# Patient Record
Sex: MALE | Race: WHITE | Employment: FULL TIME | URBAN - METROPOLITAN AREA
[De-identification: names, ages, dates, MRNs, and addresses within clinical notes are randomized per-mention and may not be internally consistent; named-entity substitution may affect disease eponyms.]

---

## 2017-02-20 ENCOUNTER — CLINICAL SUPPORT (OUTPATIENT)
Dept: CARDIOLOGY CLINIC | Age: 52
End: 2017-02-20

## 2017-02-20 DIAGNOSIS — Z02.89 EXAMINATION, PHYSICAL, EMPLOYEE: Primary | ICD-10-CM

## 2019-02-15 LAB
HBA1C MFR BLD HPLC: 5.4 %
LDL-C, EXTERNAL: 180

## 2020-11-16 VITALS
HEIGHT: 70 IN | OXYGEN SATURATION: 97 % | RESPIRATION RATE: 18 BRPM | TEMPERATURE: 98 F | HEART RATE: 68 BPM | WEIGHT: 209.75 LBS | DIASTOLIC BLOOD PRESSURE: 91 MMHG | SYSTOLIC BLOOD PRESSURE: 121 MMHG | BODY MASS INDEX: 30.03 KG/M2

## 2020-11-16 PROBLEM — E03.9 ACQUIRED HYPOTHYROIDISM: Status: ACTIVE | Noted: 2017-03-16

## 2020-11-16 RX ORDER — LEVOTHYROXINE SODIUM 75 UG/1
TABLET ORAL
COMMUNITY
Start: 2020-10-29 | End: 2020-11-24 | Stop reason: SDUPTHER

## 2020-11-24 ENCOUNTER — OFFICE VISIT (OUTPATIENT)
Dept: PRIMARY CARE CLINIC | Age: 55
End: 2020-11-24
Payer: COMMERCIAL

## 2020-11-24 VITALS
BODY MASS INDEX: 30.24 KG/M2 | HEIGHT: 70 IN | SYSTOLIC BLOOD PRESSURE: 134 MMHG | WEIGHT: 211.25 LBS | OXYGEN SATURATION: 96 % | TEMPERATURE: 97.1 F | DIASTOLIC BLOOD PRESSURE: 89 MMHG | RESPIRATION RATE: 20 BRPM | HEART RATE: 84 BPM

## 2020-11-24 DIAGNOSIS — E78.2 MIXED HYPERLIPIDEMIA: ICD-10-CM

## 2020-11-24 DIAGNOSIS — E03.9 ACQUIRED HYPOTHYROIDISM: Primary | ICD-10-CM

## 2020-11-24 DIAGNOSIS — E66.09 CLASS 1 OBESITY DUE TO EXCESS CALORIES WITHOUT SERIOUS COMORBIDITY WITH BODY MASS INDEX (BMI) OF 30.0 TO 30.9 IN ADULT: ICD-10-CM

## 2020-11-24 PROCEDURE — 99213 OFFICE O/P EST LOW 20 MIN: CPT | Performed by: FAMILY MEDICINE

## 2020-11-24 RX ORDER — LEVOTHYROXINE SODIUM 75 UG/1
TABLET ORAL
Qty: 90 TAB | Refills: 1 | Status: SHIPPED | OUTPATIENT
Start: 2020-11-24 | End: 2021-08-06

## 2020-11-24 NOTE — PROGRESS NOTES
Olga Lidia Mccormick is a 50 y.o. male who presents today for the following:  Chief Complaint   Patient presents with    Medication Refill   ,     ICD-10-CM ICD-9-CM    1. Acquired hypothyroidism  E03.9 244.9 Euthyrox 75 mcg tablet   2. Class 1 obesity due to excess calories without serious comorbidity with body mass index (BMI) of 30.0 to 30.9 in adult  E66.09 278.00     Z68.30 V85.30    3. Mixed hyperlipidemia  E78.2 272.2     . Patient comes in for follow-up of his hypothyroid disease. He also has obesity with history of hyperlipidemia. He gets an annual physical through his work and always sends us a copy. We will defer any lab work today until we receive that. He plans to do this in February 2021. He feels well with no new problems    No Known Allergies    Current Outpatient Medications   Medication Sig    Euthyrox 75 mcg tablet Take 1 tablet each day     No current facility-administered medications for this visit.         Past Medical History:   Diagnosis Date    Acquired hypothyroidism 3/16/2017       Past Surgical History:   Procedure Laterality Date    HX APPENDECTOMY      HX HEENT  2016    HPV throat       Family History   Problem Relation Age of Onset    No Known Problems Mother     No Known Problems Father        Social History     Socioeconomic History    Marital status: UNKNOWN     Spouse name: Not on file    Number of children: Not on file    Years of education: Not on file    Highest education level: Not on file   Occupational History    Not on file   Social Needs    Financial resource strain: Not on file    Food insecurity     Worry: Not on file     Inability: Not on file   [employer] needs     Medical: Not on file     Non-medical: Not on file   Tobacco Use    Smoking status: Never Smoker    Smokeless tobacco: Never Used   Substance and Sexual Activity    Alcohol use: Yes     Comment: Moderate    Drug use: Never    Sexual activity: Not on file   Lifestyle    Physical activity     Days per week: Not on file     Minutes per session: Not on file    Stress: Not on file   Relationships    Social connections     Talks on phone: Not on file     Gets together: Not on file     Attends [employer] service: Not on file     Active member of club or organization: Not on file     Attends meetings of clubs or organizations: Not on file     Relationship status: Not on file    Intimate partner violence     Fear of current or ex partner: Not on file     Emotionally abused: Not on file     Physically abused: Not on file     Forced sexual activity: Not on file   Other Topics Concern    Not on file   Social History Narrative    Not on file         Review of Systems   Constitutional: Negative. Respiratory: Negative. Cardiovascular: Negative. Gastrointestinal: Negative. Genitourinary: Negative. Musculoskeletal: Negative. Neurological: Negative. Endo/Heme/Allergies:        Doing well on current dose of thyroid medication   Psychiatric/Behavioral: Negative. All other systems reviewed and are negative. Visit Vitals  /89 (BP 1 Location: Right arm, BP Patient Position: Sitting)   Pulse 84   Temp 97.1 °F (36.2 °C) (Skin)   Resp 20   Ht 5' 10\" (1.778 m)   Wt 211 lb 4 oz (95.8 kg)   SpO2 96%   BMI 30.31 kg/m²       Physical Exam  Vitals signs and nursing note reviewed. Constitutional:       Appearance: Normal appearance. He is normal weight. HENT:      Head: Normocephalic and atraumatic. Neck:      Musculoskeletal: Normal range of motion and neck supple. Cardiovascular:      Rate and Rhythm: Normal rate and regular rhythm. Pulses: Normal pulses. Heart sounds: Normal heart sounds. Pulmonary:      Effort: Pulmonary effort is normal.      Breath sounds: Normal breath sounds. Abdominal:      General: Abdomen is flat. Bowel sounds are normal.      Palpations: Abdomen is soft. Musculoskeletal: Normal range of motion.    Skin:     General: Skin is warm and dry. Neurological:      General: No focal deficit present. Mental Status: He is alert. Psychiatric:         Mood and Affect: Mood normal.         Behavior: Behavior normal.         Thought Content: Thought content normal.         Judgment: Judgment normal.            ICD-10-CM ICD-9-CM    1. Acquired hypothyroidism  E03.9 244.9 Euthyrox 75 mcg tablet   2. Class 1 obesity due to excess calories without serious comorbidity with body mass index (BMI) of 30.0 to 30.9 in adult  E66.09 278.00     Z68.30 V85.30    3. Mixed hyperlipidemia  E78.2 272.2         1. Acquired hypothyroidism    - Euthyrox 75 mcg tablet; Take 1 tablet each day  Dispense: 90 Tab; Refill: 1    2. Class 1 obesity due to excess calories without serious comorbidity with body mass index (BMI) of 30.0 to 30.9 in adult      3.  Mixed hyperlipidemia

## 2020-11-24 NOTE — LETTER
11/24/2020 5:25 PM 
 
Mr. Nabil Blanchard [address] [id number] To whom it may concern: My patient, Yoseph Davenport will be going out of the country to attend a wedding. He will be returning to the [place] on 12/11/2020 and will need to quarantine from 12/12/2020 to 12/25/2020/ He will need to use his sick leave for this period of quarantine. If you have additional questions or requirements, please let me know. Sincerely, Signed By: Kamilah Tsang MD   
 November 24, 2020 Kamilah Tsang MD

## 2020-11-30 NOTE — PATIENT INSTRUCTIONS
Hypothyroidism: Care Instructions Your Care Instructions When you have hypothyroidism, your body doesn't make enough thyroid hormone. This hormone helps your body use energy. If your thyroid level is low, you may feel tired, be constipated, have an increase in your blood pressure, or have dry skin or memory problems. You may also get cold easily, even when it is warm. Women with low thyroid levels may have heavy menstrual periods. A blood test to find your thyroid-stimulating hormone (TSH) level is used to check for hypothyroidism. A high TSH level may mean that you have it. The treatment for hypothyroidism is thyroid hormone pills. You should start to feel better in 1 to 2 weeks. Most people need treatment for the rest of their lives. You will need regular visits with your doctor to make sure you are doing well and that you have the right dose of medicine. Follow-up care is a key part of your treatment and safety. Be sure to make and go to all appointments, and call your doctor if you are having problems. It's also a good idea to know your test results and keep a list of the medicines you take. How can you care for yourself at home? · Take your thyroid hormone medicine exactly as prescribed. Call your doctor if you think you are having a problem with your medicine. Most people do not have side effects if they take the right amount of medicine regularly. ? Take the medicine 30 minutes before breakfast, and do not take it with calcium, vitamins, or iron. ? Do not take extra doses of your thyroid medicine. It will not help you get better any faster, and it may cause side effects. ? If you forget to take a dose, do NOT take a double dose of medicine. Take your usual dose the next day. · Tell your doctor about all prescription, herbal, or over-the-counter products you take. · Take care of yourself. Eat a healthy diet, get enough sleep, and get regular exercise. When should you call for help? Call 911 anytime you think you may need emergency care. For example, call if: 
  · You passed out (lost consciousness).  
  · You have severe trouble breathing.  
  · You have a very slow heartbeat (less than 60 beats a minute).  
  · You have a low body temperature (95°F or below). Call your doctor now or seek immediate medical care if: 
  · You feel tired, sluggish, or weak.  
  · You have trouble remembering things or concentrating.  
  · You do not begin to feel better 2 weeks after starting your medicine. Watch closely for changes in your health, and be sure to contact your doctor if you have any problems. Where can you learn more? Go to http://www.gray.com/ Enter U508 in the search box to learn more about \"Hypothyroidism: Care Instructions. \" Current as of: March 31, 2020               Content Version: 12.6 © 6722-6049 [employer]. Care instructions adapted under license by [employer] (which disclaims liability or warranty for this information). If you have questions about a medical condition or this instruction, always ask your healthcare professional. [address] any warranty or liability for your use of this information.

## 2021-08-03 DIAGNOSIS — E03.9 ACQUIRED HYPOTHYROIDISM: ICD-10-CM

## 2021-08-06 RX ORDER — LEVOTHYROXINE SODIUM 75 UG/1
TABLET ORAL
Qty: 90 TABLET | Refills: 0 | Status: SHIPPED | OUTPATIENT
Start: 2021-08-06 | End: 2021-09-13 | Stop reason: SDUPTHER

## 2021-09-13 DIAGNOSIS — E03.9 ACQUIRED HYPOTHYROIDISM: ICD-10-CM

## 2021-09-13 NOTE — TELEPHONE ENCOUNTER
Requested Prescriptions     Pending Prescriptions Disp Refills    Euthyrox 75 mcg tablet 90 Tablet 0     Sig: Take 1 tablet by mouth once daily   patient has a scheduled appointment for 10/21/21 at 1:30pm.

## 2021-09-15 RX ORDER — LEVOTHYROXINE SODIUM 75 UG/1
TABLET ORAL
Qty: 90 TABLET | Refills: 1 | Status: SHIPPED | OUTPATIENT
Start: 2021-09-15

## 2021-10-21 ENCOUNTER — OFFICE VISIT (OUTPATIENT)
Dept: PRIMARY CARE CLINIC | Age: 56
End: 2021-10-21
Payer: COMMERCIAL

## 2021-10-21 VITALS
OXYGEN SATURATION: 96 % | HEART RATE: 84 BPM | RESPIRATION RATE: 20 BRPM | HEIGHT: 70 IN | SYSTOLIC BLOOD PRESSURE: 133 MMHG | DIASTOLIC BLOOD PRESSURE: 81 MMHG | WEIGHT: 213.13 LBS | BODY MASS INDEX: 30.51 KG/M2 | TEMPERATURE: 97.7 F

## 2021-10-21 DIAGNOSIS — G47.00 INSOMNIA, UNSPECIFIED TYPE: ICD-10-CM

## 2021-10-21 DIAGNOSIS — R53.83 FATIGUE, UNSPECIFIED TYPE: ICD-10-CM

## 2021-10-21 DIAGNOSIS — E03.9 ACQUIRED HYPOTHYROIDISM: Primary | ICD-10-CM

## 2021-10-21 PROCEDURE — 99214 OFFICE O/P EST MOD 30 MIN: CPT | Performed by: FAMILY MEDICINE

## 2021-10-21 NOTE — PROGRESS NOTES
Chief Complaint   Patient presents with    Hypothyroidism    Fatigue    Labs     1. Have you been to the ER, urgent care clinic since your last visit? Hospitalized since your last visit? Yes [facility]. 2. Have you seen or consulted any other health care providers outside of the [address] [place] [address] since your last visit? Include any pap smears or colon screening. Molly Arriaza (: 1965) is a 49 y.o. male, established patient, here for evaluation of the following chief complaint(s):  Hypothyroidism, Fatigue, and Labs       ASSESSMENT/PLAN:  Below is the assessment and plan developed based on review of pertinent history, physical exam, labs, studies, and medications. 1. Acquired hypothyroidism  2. Fatigue, unspecified type  3. Insomnia, unspecified type      Return in about 6 months (around 2022) for Follow up of chronic medical conditions, Fasting Lab Appointment. SUBJECTIVE/OBJECTIVE:  This patient comes in for follow up of his hypothyroid disease. He does not have the energy he needs to do his  job. He had a company physical with labs and his thyroid numbers were good. He admits to not eating a healthy diet and has had difficulty losing weight. He used to run 2 miles a day but has not been able to do that for several years and is slowly trying to work up to that but he just does not have the energy. He is concerned he may not pass his  physical this year. He does snore but has never been tested for sleep apnea. He says he does not feel tired during the day he just does not have the endurance that he used to have in the past.        Review of Systems   Constitutional: Positive for fatigue. Respiratory: Negative. Cardiovascular: Negative. Gastrointestinal: Negative. Endocrine: Negative. Genitourinary: Negative. Musculoskeletal: Negative. Neurological: Negative.     Psychiatric/Behavioral: Negative. Physical Exam  Constitutional:       Appearance: Normal appearance. He is obese. Cardiovascular:      Rate and Rhythm: Normal rate and regular rhythm. Heart sounds: Normal heart sounds. Pulmonary:      Effort: Pulmonary effort is normal.      Breath sounds: Normal breath sounds. Abdominal:      General: Abdomen is flat. Bowel sounds are normal.      Palpations: Abdomen is soft. Neurological:      General: No focal deficit present. Mental Status: He is alert. Psychiatric:         Mood and Affect: Mood normal.         Behavior: Behavior normal.         Thought Content: Thought content normal.         Judgment: Judgment normal.       We discussed his lab work which was essentially normal according to him and he will send in a copy of this once he gets home. He does get cardiac tests including a calcium score as well as a cardiac stress test every 3 years and has never had any signs of coronary artery disease and his last calcium score was 0  We talked about poor conditioning and the effect that a poor diet has on his overall function. We talked about possibly doing a home sleep study to see if he may have sleep apnea related to his symptoms but he has decided to work on his diet first.  Once I have reviewed his lab I will check to see if there is anything additional I might recommend for him. An electronic signature was used to authenticate this note.   -- Murphy Chiu MD

## 2022-03-18 PROBLEM — E03.9 ACQUIRED HYPOTHYROIDISM: Status: ACTIVE | Noted: 2017-03-16

## 2023-05-18 RX ORDER — LEVOTHYROXINE SODIUM 0.07 MG/1
1 TABLET ORAL DAILY
COMMUNITY
Start: 2021-09-15

## 2023-12-01 ENCOUNTER — OFFICE VISIT (OUTPATIENT)
Age: 58
End: 2023-12-01
Payer: COMMERCIAL

## 2023-12-01 VITALS
TEMPERATURE: 99.3 F | WEIGHT: 208 LBS | HEART RATE: 80 BPM | BODY MASS INDEX: 29.84 KG/M2 | SYSTOLIC BLOOD PRESSURE: 169 MMHG | DIASTOLIC BLOOD PRESSURE: 102 MMHG | RESPIRATION RATE: 16 BRPM | OXYGEN SATURATION: 95 %

## 2023-12-01 DIAGNOSIS — M33.10 DERMATOMYOSITIS SINE MYOSITIS (HCC): Primary | ICD-10-CM

## 2023-12-01 DIAGNOSIS — C80.1 MALIGNANCY (HCC): ICD-10-CM

## 2023-12-01 DIAGNOSIS — R21 RASH: ICD-10-CM

## 2023-12-01 PROCEDURE — 99245 OFF/OP CONSLTJ NEW/EST HI 55: CPT | Performed by: PEDIATRICS

## 2023-12-01 RX ORDER — PREDNISONE 20 MG/1
TABLET ORAL
COMMUNITY
Start: 2023-11-22

## 2023-12-01 ASSESSMENT — PATIENT HEALTH QUESTIONNAIRE - PHQ9
1. LITTLE INTEREST OR PLEASURE IN DOING THINGS: 0
2. FEELING DOWN, DEPRESSED OR HOPELESS: 0
SUM OF ALL RESPONSES TO PHQ9 QUESTIONS 1 & 2: 0
SUM OF ALL RESPONSES TO PHQ QUESTIONS 1-9: 0

## 2023-12-01 NOTE — PROGRESS NOTES
CHIEF COMPLAINT  The patient was sent for rheumatology consultation for evaluation of rash. HISTORY OF PRESENT ILLNESS  This is a 66 y.o.  male. Today, the patient complains of rash. Location: generalized  Severity:  0 on a scale of 0-10  Timing: all day   Duration: 6 months  Modifying factors: prednisone  Context/Associated signs and symptoms: Around June the patient was cleaning the pool and out in the sun for a long period of time. Two weeks later he noticed a rash on his shoulders and back. He describes the rash as itchy and burning. He also describes a \"pulling sensation\" in his skin with movement of his arms and back. Two weeks after that he noticed blistering on his shoulders. He continued to have the rash and saw dermatology in September who prescribed topicals which did not help. He then did blood work and a biopsy with dermatology and states he was diagnosed with lupus based on his TAL. He was started on Plaquenil by dermatology. He was on this for about 2 weeks and the rash started to get worse and spread all over his body. Dermatology thought this may be a drug rash so she stopped Plaquenil and started him on prednisone 60 mg about a week ago. He has noticed some improvement in rash with prednisone. He has recently started to notice that his nail beds have been more tender. He denies any muscle weakness, persistent joint pain, joint swelling, morning stiffness, weight loss, blood in stool. Reviewed lab work including positive TAL (1:640), SSA and SSB negative, normal CK, normal CBC.      RHEUMATOLOGY REVIEW OF SYSTEMS   Positives as per HPI  Negatives as follows:  Boubacar Jerez:  Denies unexplained persistent fevers, weight change, chronic fatigue  HEAD/EYES:   Denies eye redness, blurry vision or sudden loss of vision, dry eyes, HA, temporal artery pain  ENT:    Denies oral/nasal ulcers, recurrent sinus infections, dry mouth  RESPIRATORY:  No pleuritic pain, history of pleural effusions,

## 2023-12-01 NOTE — PROGRESS NOTES
Chief Complaint   Patient presents with    Joint Pain     1. Have you been to the ER, urgent care clinic since your last visit? Hospitalized since your last visit? No    2. Have you seen or consulted any other health care providers outside of the [address] [place] [address] Avenue since your last visit? Include any pap smears or colon screening. No    Patient stated he has been feeling stressed due to his medical issues, and is aware of his BP being high. 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 Did not perform orthostatic.

## 2023-12-02 LAB
APPEARANCE UR: CLEAR
BILIRUB UR QL STRIP: NEGATIVE
C3 SERPL-MCNC: 126 MG/DL (ref 82–167)
C4 SERPL-MCNC: 28 MG/DL (ref 12–38)
CANCER AG125 SERPL-ACNC: 16 U/ML
CANCER AG19-9 SERPL-ACNC: 6 U/ML (ref 0–35)
CCP IGA+IGG SERPL IA-ACNC: <1 UNITS (ref 0–19)
COLOR UR: YELLOW
CREAT UR-MCNC: 108 MG/DL
CRP SERPL-MCNC: <1 MG/L (ref 0–10)
ERYTHROCYTE [SEDIMENTATION RATE] IN BLOOD BY WESTERGREN METHOD: 6 MM/HR (ref 0–30)
FERRITIN SERPL-MCNC: 374 NG/ML (ref 30–400)
GLUCOSE UR QL STRIP: NEGATIVE
HGB UR QL STRIP: NEGATIVE
KETONES UR QL STRIP: NEGATIVE
LDH SERPL L TO P-CCNC: 270 IU/L (ref 121–224)
LEUKOCYTE ESTERASE UR QL STRIP: NEGATIVE
NITRITE UR QL STRIP: NEGATIVE
PH UR STRIP: 7.5 [PH] (ref 5–7.5)
PROT UR QL STRIP: NEGATIVE
PROT UR-MCNC: 10.7 MG/DL
PSA SERPL-MCNC: 0.6 NG/ML (ref 0–4)
SP GR UR STRIP: 1.02 (ref 1–1.03)
UROBILINOGEN UR STRIP-MCNC: 1 MG/DL (ref 0.2–1)

## 2023-12-04 LAB
ALDOLASE SERPL-CCNC: 8.8 U/L (ref 3.3–10.3)
IGA SERPL-MCNC: 284 MG/DL (ref 90–386)
IGE SERPL-ACNC: 44 IU/ML (ref 6–495)
IGG SERPL-MCNC: 1145 MG/DL (ref 603–1613)
IGM SERPL-MCNC: 90 MG/DL (ref 20–172)

## 2023-12-05 LAB — RHEUMATOID FACT SERPL-ACNC: <14 IU/ML

## 2023-12-07 LAB — PM-SCL75 AB SER LINE BLOT-ACNC: <20 UNITS

## 2023-12-08 LAB — DSDNA AB SER FARR-ACNC: <8 IU/ML

## 2023-12-09 LAB — ENA SM AB SER-ACNC: <20 UNITS

## 2023-12-12 ENCOUNTER — HOSPITAL ENCOUNTER (OUTPATIENT)
Facility: HOSPITAL | Age: 58
Discharge: HOME | End: 2023-12-15
Payer: COMMERCIAL

## 2023-12-12 DIAGNOSIS — C80.1 MALIGNANCY (HCC): ICD-10-CM

## 2023-12-12 DIAGNOSIS — R21 RASH: ICD-10-CM

## 2023-12-12 DIAGNOSIS — M33.10 DERMATOMYOSITIS SINE MYOSITIS (HCC): ICD-10-CM

## 2023-12-12 PROCEDURE — 94375 RESPIRATORY FLOW VOLUME LOOP: CPT

## 2023-12-12 PROCEDURE — 94729 DIFFUSING CAPACITY: CPT

## 2023-12-12 PROCEDURE — G0238 OTH RESP PROC, INDIV: HCPCS

## 2023-12-12 PROCEDURE — 94726 PLETHYSMOGRAPHY LUNG VOLUMES: CPT

## 2023-12-13 ENCOUNTER — HOSPITAL ENCOUNTER (OUTPATIENT)
Facility: HOSPITAL | Age: 58
Discharge: HOME | End: 2023-12-16
Payer: COMMERCIAL

## 2023-12-13 DIAGNOSIS — R21 RASH: ICD-10-CM

## 2023-12-13 DIAGNOSIS — M33.10 DERMATOMYOSITIS SINE MYOSITIS (HCC): ICD-10-CM

## 2023-12-13 DIAGNOSIS — C80.1 MALIGNANCY (HCC): ICD-10-CM

## 2023-12-13 PROCEDURE — 71250 CT THORAX DX C-: CPT

## 2023-12-13 PROCEDURE — 74176 CT ABD & PELVIS W/O CONTRAST: CPT

## 2024-01-16 ENCOUNTER — OFFICE VISIT (OUTPATIENT)
Age: 59
End: 2024-01-16
Payer: COMMERCIAL

## 2024-01-16 VITALS
SYSTOLIC BLOOD PRESSURE: 136 MMHG | HEART RATE: 90 BPM | RESPIRATION RATE: 16 BRPM | WEIGHT: 206 LBS | BODY MASS INDEX: 29.56 KG/M2 | DIASTOLIC BLOOD PRESSURE: 100 MMHG | OXYGEN SATURATION: 97 % | TEMPERATURE: 98.1 F

## 2024-01-16 DIAGNOSIS — M33.10 DERMATOMYOSITIS SINE MYOSITIS (HCC): ICD-10-CM

## 2024-01-16 DIAGNOSIS — R21 RASH: Primary | ICD-10-CM

## 2024-01-16 PROCEDURE — 99214 OFFICE O/P EST MOD 30 MIN: CPT | Performed by: PEDIATRICS

## 2024-01-16 RX ORDER — TRIAMCINOLONE ACETONIDE 1 MG/G
CREAM TOPICAL
COMMUNITY
Start: 2023-12-26

## 2024-01-16 RX ORDER — UPADACITINIB 15 MG/1
15 TABLET, EXTENDED RELEASE ORAL DAILY
Qty: 32 TABLET | Refills: 0 | Status: SHIPPED | COMMUNITY
Start: 2024-01-16

## 2024-01-16 ASSESSMENT — PATIENT HEALTH QUESTIONNAIRE - PHQ9
SUM OF ALL RESPONSES TO PHQ QUESTIONS 1-9: 0
SUM OF ALL RESPONSES TO PHQ QUESTIONS 1-9: 0
1. LITTLE INTEREST OR PLEASURE IN DOING THINGS: 0
2. FEELING DOWN, DEPRESSED OR HOPELESS: 0
SUM OF ALL RESPONSES TO PHQ QUESTIONS 1-9: 0
SUM OF ALL RESPONSES TO PHQ QUESTIONS 1-9: 0
SUM OF ALL RESPONSES TO PHQ9 QUESTIONS 1 & 2: 0

## 2024-01-16 NOTE — PROGRESS NOTES
Chief Complaint   Patient presents with    Joint Pain     1. Have you been to the ER, urgent care clinic since your last visit?  Hospitalized since your last visit?No    2. Have you seen or consulted any other health care providers outside of the [facility] since your last visit?  Include any pap smears or colon screening. No   
  VASCULAR:   Denies edema, cyanosis, raynaud phenomenon  NEUROLOGIC:  Denies specific muscle weakness, paresthesias   PSYCHIATRIC:  No sleep disturbance / snoring, depression, anxiety  MSK:    No morning stiffness >1 hour, SI joint pain, persistent joint swelling, persistent joint pain    PAST MEDICAL HISTORY  Reviewed with patient, significant changes in medical history - no    FAMILY HISTORY  No autoimmune disease in 1st degree relatives     MEDICATIONS  All the current medications were reviewed in detail.      PHYSICAL EXAM  Blood pressure (!) 136/100, pulse 90, temperature 98.1 °F (36.7 °C), temperature source Oral, resp. rate 16, weight 93.4 kg (206 lb), SpO2 97 %.  GENERAL APPEARANCE: Well-nourished adult in no acute distress.  EYES: No scleral erythema, conjunctival injection.  ENT: No oral ulcer, parotid enlargement.  NECK: No adenopathy, thyroid enlargement.  CARDIOVASCULAR: Heart rhythm is regular. No murmur, rub, gallop.  CHEST: Normal vesicular breath sounds. No wheezes, rales, pleural friction rubs.  ABDOMINAL: The abdomen is soft and nontender. Liver and spleen are nonpalpable. Bowel sounds are normal.  EXTREMITIES: There is no evidence of clubbing, cyanosis, edema. Nailfold changes noted   SKIN: Vasculitis ulcers on the tips of his fingers. Scaly erythematous rash over forearm, scalp, back, legs. Cuticle hypertrophy. - all improved   NEUROLOGICAL: Normal gait and station, full strength in upper and lower extremities, normal sensation to light touch.  MUSCULOSKELETAL:   Upper extremities - full range of motion, no tenderness, no swelling, no synovial thickening and no deformity of joints.  Lower extremities - full range of motion, no tenderness, no swelling, no synovial thickening and no deformity of joints.      SKIN    Heliotrope Rash:   no  Upper Arm Erythema:   no  Shawl Sign:    yes  V-sign:     yes  Holster sign:    no  Gottron's papules:   no  Gottron's sign:    no  Calcinosis:    no  Raynaud's

## 2024-01-19 RX ORDER — UPADACITINIB 15 MG/1
15 TABLET, EXTENDED RELEASE ORAL DAILY
Qty: 30 TABLET | Refills: 4 | Status: SHIPPED | OUTPATIENT
Start: 2024-01-19

## 2024-02-22 LAB
ALBUMIN SERPL-MCNC: 4.7 G/DL (ref 3.8–4.9)
ALBUMIN/GLOB SERPL: 1.9 {RATIO} (ref 1.2–2.2)
ALDOLASE SERPL-CCNC: 7.1 U/L (ref 3.3–10.3)
ALP SERPL-CCNC: 52 IU/L (ref 44–121)
ALT SERPL-CCNC: 29 IU/L (ref 0–44)
AST SERPL-CCNC: 26 IU/L (ref 0–40)
BASOPHILS # BLD AUTO: 0 X10E3/UL (ref 0–0.2)
BASOPHILS NFR BLD AUTO: 1 %
BILIRUB SERPL-MCNC: 0.6 MG/DL (ref 0–1.2)
BUN SERPL-MCNC: 16 MG/DL (ref 6–24)
BUN/CREAT SERPL: 17 (ref 9–20)
CALCIUM SERPL-MCNC: 9.9 MG/DL (ref 8.7–10.2)
CHLORIDE SERPL-SCNC: 102 MMOL/L (ref 96–106)
CK SERPL-CCNC: 165 U/L (ref 41–331)
CO2 SERPL-SCNC: 24 MMOL/L (ref 20–29)
CREAT SERPL-MCNC: 0.92 MG/DL (ref 0.76–1.27)
EGFRCR SERPLBLD CKD-EPI 2021: 97 ML/MIN/1.73
EOSINOPHIL # BLD AUTO: 0 X10E3/UL (ref 0–0.4)
EOSINOPHIL NFR BLD AUTO: 0 %
ERYTHROCYTE [DISTWIDTH] IN BLOOD BY AUTOMATED COUNT: 13.8 % (ref 11.6–15.4)
GLOBULIN SER CALC-MCNC: 2.5 G/DL (ref 1.5–4.5)
GLUCOSE SERPL-MCNC: 113 MG/DL (ref 70–99)
HCT VFR BLD AUTO: 48.2 % (ref 37.5–51)
HGB BLD-MCNC: 16.2 G/DL (ref 13–17.7)
IMM GRANULOCYTES # BLD AUTO: 0.1 X10E3/UL (ref 0–0.1)
IMM GRANULOCYTES NFR BLD AUTO: 2 %
LDH SERPL L TO P-CCNC: 237 IU/L (ref 121–224)
LYMPHOCYTES # BLD AUTO: 0.6 X10E3/UL (ref 0.7–3.1)
LYMPHOCYTES NFR BLD AUTO: 13 %
MCH RBC QN AUTO: 31.3 PG (ref 26.6–33)
MCHC RBC AUTO-ENTMCNC: 33.6 G/DL (ref 31.5–35.7)
MCV RBC AUTO: 93 FL (ref 79–97)
MONOCYTES # BLD AUTO: 0.6 X10E3/UL (ref 0.1–0.9)
MONOCYTES NFR BLD AUTO: 13 %
NEUTROPHILS # BLD AUTO: 3.4 X10E3/UL (ref 1.4–7)
NEUTROPHILS NFR BLD AUTO: 71 %
PLATELET # BLD AUTO: 214 X10E3/UL (ref 150–450)
POTASSIUM SERPL-SCNC: 4.6 MMOL/L (ref 3.5–5.2)
PROT SERPL-MCNC: 7.2 G/DL (ref 6–8.5)
RBC # BLD AUTO: 5.17 X10E6/UL (ref 4.14–5.8)
SODIUM SERPL-SCNC: 140 MMOL/L (ref 134–144)
WBC # BLD AUTO: 4.8 X10E3/UL (ref 3.4–10.8)

## 2024-02-23 RX ORDER — METHOTREXATE 2.5 MG/1
10 TABLET ORAL WEEKLY
Qty: 16 TABLET | Refills: 3 | Status: SHIPPED | OUTPATIENT
Start: 2024-02-23

## 2024-02-28 ENCOUNTER — OFFICE VISIT (OUTPATIENT)
Age: 59
End: 2024-02-28
Payer: COMMERCIAL

## 2024-02-28 VITALS
BODY MASS INDEX: 29.99 KG/M2 | HEART RATE: 91 BPM | RESPIRATION RATE: 16 BRPM | OXYGEN SATURATION: 96 % | WEIGHT: 209 LBS | SYSTOLIC BLOOD PRESSURE: 157 MMHG | TEMPERATURE: 98 F | DIASTOLIC BLOOD PRESSURE: 107 MMHG

## 2024-02-28 DIAGNOSIS — M33.10 DERMATOMYOSITIS SINE MYOSITIS (HCC): Primary | ICD-10-CM

## 2024-02-28 DIAGNOSIS — R21 RASH: ICD-10-CM

## 2024-02-28 PROCEDURE — 99215 OFFICE O/P EST HI 40 MIN: CPT | Performed by: PEDIATRICS

## 2024-02-28 RX ORDER — PREDNISONE 5 MG/1
20 TABLET ORAL 2 TIMES DAILY
Qty: 240 TABLET | Refills: 1 | Status: SHIPPED | OUTPATIENT
Start: 2024-02-28 | End: 2024-03-29

## 2024-02-28 RX ORDER — UPADACITINIB 15 MG/1
15 TABLET, EXTENDED RELEASE ORAL DAILY
Qty: 42 TABLET | Refills: 0 | Status: SHIPPED | COMMUNITY
Start: 2024-02-28

## 2024-02-28 RX ORDER — FOLIC ACID 1 MG/1
1 TABLET ORAL DAILY
Qty: 30 TABLET | Refills: 11 | Status: SHIPPED | OUTPATIENT
Start: 2024-02-28

## 2024-02-28 RX ORDER — HYDROXYZINE HYDROCHLORIDE 25 MG/1
25 TABLET, FILM COATED ORAL NIGHTLY
Qty: 30 TABLET | Refills: 0 | Status: SHIPPED | OUTPATIENT
Start: 2024-02-28 | End: 2024-03-29

## 2024-02-28 RX ORDER — HYDROXYCHLOROQUINE SULFATE 200 MG/1
400 TABLET, FILM COATED ORAL DAILY
Qty: 60 TABLET | Refills: 3 | Status: SHIPPED | OUTPATIENT
Start: 2024-02-28

## 2024-02-28 ASSESSMENT — PATIENT HEALTH QUESTIONNAIRE - PHQ9
2. FEELING DOWN, DEPRESSED OR HOPELESS: 0
SUM OF ALL RESPONSES TO PHQ QUESTIONS 1-9: 0
1. LITTLE INTEREST OR PLEASURE IN DOING THINGS: 0
SUM OF ALL RESPONSES TO PHQ QUESTIONS 1-9: 0

## 2024-02-28 NOTE — PROGRESS NOTES
Chief Complaint   Patient presents with    Joint Pain     1. Have you been to the ER, urgent care clinic since your last visit?  Hospitalized since your last visit?No    2. Have you seen or consulted any other health care providers outside of the [facility] since your last visit?  Include any pap smears or colon screening. No    
alopecia, ulcers, nodules, sun sensitivity, unexplained persistent rash   VASCULAR:   Denies edema, cyanosis, raynaud phenomenon  NEUROLOGIC:  Denies specific muscle weakness, paresthesias   PSYCHIATRIC:  No sleep disturbance / snoring, depression, anxiety  MSK:    No morning stiffness >1 hour, SI joint pain, persistent joint swelling, persistent joint pain    PAST MEDICAL HISTORY  Reviewed with patient, significant changes in medical history - no    FAMILY HISTORY  No autoimmune disease in 1st degree relatives     MEDICATIONS  All the current medications were reviewed in detail.      PHYSICAL EXAM  Blood pressure (!) 157/107, pulse 91, temperature 98 °F (36.7 °C), temperature source Oral, resp. rate 16, weight 94.8 kg (209 lb), SpO2 96 %.  GENERAL APPEARANCE: Well-nourished adult in no acute distress.  EYES: No scleral erythema, conjunctival injection.  ENT: No oral ulcer, parotid enlargement.  NECK: No adenopathy, thyroid enlargement.  CARDIOVASCULAR: Heart rhythm is regular. No murmur, rub, gallop.  CHEST: Normal vesicular breath sounds. No wheezes, rales, pleural friction rubs.  ABDOMINAL: The abdomen is soft and nontender. Liver and spleen are nonpalpable. Bowel sounds are normal.  EXTREMITIES: There is no evidence of clubbing, cyanosis, edema. Nailfold changes noted   SKIN: Vasculitis ulcers on the tips of his fingers. Scaly erythematous rash over forearm, scalp, back, legs. Cuticle hypertrophy. - all improved   NEUROLOGICAL: Normal gait and station, full strength in upper and lower extremities, normal sensation to light touch.  MUSCULOSKELETAL:   Upper extremities - full range of motion, no tenderness, no swelling, no synovial thickening and no deformity of joints.  Lower extremities - full range of motion, no tenderness, no swelling, no synovial thickening and no deformity of joints.      SKIN    Heliotrope Rash:   no  Upper Arm Erythema:   no  Shawl Sign:    yes  V-sign:     yes  Carlos A

## 2024-02-28 NOTE — PATIENT INSTRUCTIONS
Prednisone 35mg (7 tablets daily) x 1 week  Prednisone 30mg (6 tablets daily) x 1 week  Prednisone 25mg (5 tablets daily) x 1 week  Prednisone 20mg (4 tablets daily) x 1 week  Prednisone 17.5mg (3  1/2 tablets daily) x 2 weeks  Prednisone 15mg (3 tablets daily) x 2 weeks  Prednisone 12.5mg (2 1/2 tablets daily) x 2 weeks  Prednisone 10mg (2 tablets daily) x 2 weeks  Prednisone 7.5mg (1 1/2 tablets daily) x 2 weeks  Prednisone 5mg (1 tablets daily) x 2 weeks  Prednisone 2.5mg (1/2 tablets daily) x 2 weeks

## 2024-04-12 ENCOUNTER — OFFICE VISIT (OUTPATIENT)
Age: 59
End: 2024-04-12
Payer: COMMERCIAL

## 2024-04-12 VITALS
RESPIRATION RATE: 16 BRPM | BODY MASS INDEX: 30.71 KG/M2 | DIASTOLIC BLOOD PRESSURE: 117 MMHG | TEMPERATURE: 98 F | WEIGHT: 214 LBS | HEART RATE: 81 BPM | OXYGEN SATURATION: 97 % | SYSTOLIC BLOOD PRESSURE: 167 MMHG

## 2024-04-12 DIAGNOSIS — M33.10 DERMATOMYOSITIS SINE MYOSITIS (HCC): Primary | ICD-10-CM

## 2024-04-12 DIAGNOSIS — R21 RASH: ICD-10-CM

## 2024-04-12 PROCEDURE — 99215 OFFICE O/P EST HI 40 MIN: CPT | Performed by: PEDIATRICS

## 2024-04-12 RX ORDER — PREDNISONE 1 MG/1
4 TABLET ORAL DAILY
Qty: 30 TABLET | Refills: 1 | Status: SHIPPED | OUTPATIENT
Start: 2024-04-12 | End: 2024-05-12

## 2024-04-12 RX ORDER — UPADACITINIB 15 MG/1
15 TABLET, EXTENDED RELEASE ORAL DAILY
Qty: 84 TABLET | Refills: 0 | Status: SHIPPED | COMMUNITY
Start: 2024-04-12

## 2024-04-12 ASSESSMENT — PATIENT HEALTH QUESTIONNAIRE - PHQ9
SUM OF ALL RESPONSES TO PHQ QUESTIONS 1-9: 0
1. LITTLE INTEREST OR PLEASURE IN DOING THINGS: NOT AT ALL
SUM OF ALL RESPONSES TO PHQ QUESTIONS 1-9: 0
SUM OF ALL RESPONSES TO PHQ QUESTIONS 1-9: 0
SUM OF ALL RESPONSES TO PHQ9 QUESTIONS 1 & 2: 0
SUM OF ALL RESPONSES TO PHQ QUESTIONS 1-9: 0
2. FEELING DOWN, DEPRESSED OR HOPELESS: NOT AT ALL

## 2024-04-12 NOTE — PROGRESS NOTES
Chief Complaint   Patient presents with    Joint Pain     1. Have you been to the ER, urgent care clinic since your last visit?  Hospitalized since your last visit?No    2. Have you seen or consulted any other health care providers outside of the [facility] since your last visit?  Include any pap smears or colon screening. No    
plugging is present, along with interface changes along the dermal/epidermal junction, and scattered dyskeratotic cells. Vacuolar units and superficial vascular plexus. There is suggestion of mucin deposition within the mid dermis. Collectively, these findings would be compatible with a connective tissue disorder such as lupus erythematous or dermatomyositis. PAS stains are negative. Correlation with the clinical presentation and serologies would be of benefit.     Previous medical records reviewed/summarized -Yes    ASSESSMENT  1. 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 He should continue with Rinvoq 15 mg daily and Plaquenil 400 mg daily. He has had some flare of rash while tapering prednisone so he should continue with 2.5 mg daily for 1 month, 2 mg daily for 1 month, then 1 mg daily. Sun protection recommended. No labs needed today.  2. Drug therapy monitoring for toxicity (DMARD and/or biologic/BUBBA inhibitor) - Recommend CBC, CMP every 4 months. Recommend TB yearly for biologic and BUBBA inhibitor approval and monioring. Recommend yearly eye exam for retinal toxicity if on plaquenil.      PLAN  1. Rinvoq 15 mg daily (6 samples provided) - seek approval  2. Plaquenil 400 mg daily  3. Prednisone 2.5 mg daily for 1 month, 2 mg daily for 1 month, 1 mg daily  4. Hydroxyzine PRN for itch  5. Follow up in 10 weeks    Aristides Person MD  Adult and Pediatric Rheumatology     [facility]   [address] [place] [address], Phone [phone], Fax [phone]     Visiting  of Pediatrics    Department of Pediatrics, [facility]   [address], [place] [address], Phone [phone], Fax [phone]    There are no Patient Instructions on file for this visit.    cc:  Quynh Rojas, CARYL - NP  Dr. Sneed - dermatology    I, Aristides Person MD, personally

## 2024-04-15 RX ORDER — UPADACITINIB 15 MG/1
15 TABLET, EXTENDED RELEASE ORAL DAILY
Qty: 30 TABLET | Refills: 4 | Status: ACTIVE | OUTPATIENT
Start: 2024-04-15

## 2024-06-10 ENCOUNTER — TELEPHONE (OUTPATIENT)
Age: 59
End: 2024-06-10

## 2024-06-10 NOTE — TELEPHONE ENCOUNTER
Called per  to schedule PT earlier on 6/21/24 due to conflict in schedule, LVM and provided office number.

## 2024-07-08 ENCOUNTER — TELEPHONE (OUTPATIENT)
Age: 59
End: 2024-07-08

## 2024-07-08 NOTE — TELEPHONE ENCOUNTER
Kathy at [facility] is calling, patient is currently doing radiation and chemo, patient is on Rinvoq and she states there is another medication that patient is on and he has a peg tube, Rinvoq can't be crushed and everything has to be put through his peg tube. They would like to know is there is something else that patient can be put on while he is doing his radiation and chemo. Kathy can be reached back at [phone]

## 2024-07-09 NOTE — TELEPHONE ENCOUNTER
Kathy at [facility] returned call from [facility] and I informed her per  No, we can just hold it and if he flares we will get samples of the pediatric rinvoq which is liquid. Rep stated she understood

## 2024-07-09 NOTE — TELEPHONE ENCOUNTER
Left a voicemail for Kathy @[facility] per Dr Person message below   No, we can just hold it and if he flares we will get samples of the pediatric rinvoq which is liquid.

## 2024-07-18 NOTE — PATIENT INSTRUCTIONS
Thank you for visiting [facility]!      For future medication refills, we require updated lab results and an upcoming appointment to be in our system prior to refilling prescriptions.  Without these two things, your refill will be DENIED.  If you miss your upcoming appointment, your refill will also be DENIED.      We appreciate your understanding of this critical clinical aspect of our practice. -Dr. Person & Claudia, NP

## 2024-07-30 ENCOUNTER — OFFICE VISIT (OUTPATIENT)
Age: 59
End: 2024-07-30
Payer: COMMERCIAL

## 2024-07-30 VITALS
HEART RATE: 103 BPM | RESPIRATION RATE: 16 BRPM | DIASTOLIC BLOOD PRESSURE: 95 MMHG | SYSTOLIC BLOOD PRESSURE: 136 MMHG | OXYGEN SATURATION: 95 % | WEIGHT: 189 LBS | BODY MASS INDEX: 27.12 KG/M2 | TEMPERATURE: 98.5 F

## 2024-07-30 DIAGNOSIS — R21 RASH: ICD-10-CM

## 2024-07-30 DIAGNOSIS — M33.10 DERMATOMYOSITIS SINE MYOSITIS (HCC): Primary | ICD-10-CM

## 2024-07-30 DIAGNOSIS — C80.1 MALIGNANCY (HCC): ICD-10-CM

## 2024-07-30 PROCEDURE — 99214 OFFICE O/P EST MOD 30 MIN: CPT | Performed by: PEDIATRICS

## 2024-07-30 RX ORDER — NUT TX, LACT-REDUCED, IRON 0.09G-2.25
5 LIQUID (ML) ORAL DAILY
COMMUNITY
Start: 2024-07-11 | End: 2025-07-11

## 2024-07-30 RX ORDER — ONDANSETRON HYDROCHLORIDE 8 MG/1
8 TABLET, FILM COATED ORAL EVERY 8 HOURS PRN
COMMUNITY
Start: 2024-05-30

## 2024-07-30 RX ORDER — OXYCODONE HCL 5 MG/5 ML
5 SOLUTION, ORAL ORAL EVERY 8 HOURS PRN
COMMUNITY
Start: 2024-07-08 | End: 2024-08-07

## 2024-07-30 RX ORDER — DEXAMETHASONE 4 MG/1
8 TABLET ORAL DAILY
COMMUNITY
Start: 2024-05-30

## 2024-07-30 ASSESSMENT — PATIENT HEALTH QUESTIONNAIRE - PHQ9
SUM OF ALL RESPONSES TO PHQ QUESTIONS 1-9: 0
SUM OF ALL RESPONSES TO PHQ9 QUESTIONS 1 & 2: 0
SUM OF ALL RESPONSES TO PHQ QUESTIONS 1-9: 0
2. FEELING DOWN, DEPRESSED OR HOPELESS: NOT AT ALL
1. LITTLE INTEREST OR PLEASURE IN DOING THINGS: NOT AT ALL

## 2024-07-30 NOTE — PROGRESS NOTES
RHEUMATOLOGY PROBLEM LIST AND CHIEF COMPLAINT  1. 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  male.  Today, the patient complains of pain in the joints.  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 He is followed by Dr. Rod Rojas (oncology) and treated with Cisplatin. He stopped Rinvoq and Plaquenil a couple weeks ago. He has been doing well from a dermatomyositis standpoint off medications and denies any recurrence of rash. Reviewed lab work from 7/29/24 including low WBC count 1.3.     RHEUMATOLOGY REVIEW OF SYSTEMS   Positives as per history  Negatives as follows:  CONSTITUTlONAL:  Denies unexplained persistent fevers, weight change, chronic fatigue  HEAD/EYES:   Denies eye redness, blurry vision or sudden loss of vision, dry eyes, HA, temporal artery pain  ENT:    Denies oral/nasal ulcers, recurrent sinus infections, dry mouth  RESPIRATORY:  No pleuritic pain, history of pleural effusions, hemoptysis, exertional dyspnea  CARDIOVASCULAR: Denies chest pain, history of pericardial effusions  GASTRO:   Denies heartburn, esophageal dysmotility, abdominal pain, nausea, vomiting, diarrhea, blood in the stool  HEMATOLOGIC:  No easy bruising, purpura, swollen lymph nodes  SKIN:    Denies alopecia, ulcers, nodules,

## 2024-07-30 NOTE — PROGRESS NOTES
Chief Complaint   Patient presents with    Joint Pain     1. Have you been to the ER, urgent care clinic since your last visit?  Hospitalized since your last visit?Yes seen in the ER for Fevers    2. Have you seen or consulted any other health care providers outside of the [facility] since your last visit?  Include any pap smears or colon screening. No

## 2024-08-16 NOTE — TELEPHONE ENCOUNTER
Last visit 07/30/24  Lab Results   Component Value Date     02/21/2024    K 4.6 02/21/2024     02/21/2024    CO2 24 02/21/2024    BUN 16 02/21/2024    CREATININE 0.92 02/21/2024    GLUCOSE 113 (H) 02/21/2024    CALCIUM 9.9 02/21/2024    BILITOT 0.6 02/21/2024    ALKPHOS 52 02/21/2024    AST 26 02/21/2024    ALT 29 02/21/2024    LABGLOM 97 02/21/2024    AGRATIO 1.9 02/21/2024     Lab Results   Component Value Date    WBC 4.8 02/21/2024    HGB 16.2 02/21/2024    HCT 48.2 02/21/2024    MCV 93 02/21/2024     02/21/2024

## 2024-08-19 RX ORDER — UPADACITINIB 15 MG/1
TABLET, EXTENDED RELEASE ORAL
Qty: 30 TABLET | Refills: 4 | Status: ACTIVE | OUTPATIENT
Start: 2024-08-19

## 2024-12-03 ENCOUNTER — OFFICE VISIT (OUTPATIENT)
Age: 59
End: 2024-12-03
Payer: COMMERCIAL

## 2024-12-03 VITALS
OXYGEN SATURATION: 97 % | BODY MASS INDEX: 26.4 KG/M2 | HEART RATE: 93 BPM | WEIGHT: 184 LBS | RESPIRATION RATE: 16 BRPM | SYSTOLIC BLOOD PRESSURE: 150 MMHG | DIASTOLIC BLOOD PRESSURE: 116 MMHG | TEMPERATURE: 98.5 F

## 2024-12-03 DIAGNOSIS — M33.10 DERMATOMYOSITIS SINE MYOSITIS (HCC): Primary | ICD-10-CM

## 2024-12-03 PROCEDURE — 99214 OFFICE O/P EST MOD 30 MIN: CPT | Performed by: PEDIATRICS

## 2024-12-03 NOTE — PROGRESS NOTES
RHEUMATOLOGY PROBLEM LIST AND CHIEF COMPLAINT  1. 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  male.  Today, the patient complains of no pain in the joints.  Location: generalized  Severity:  0 on a scale of 0-10  Timing: none  Duration:  5 months  Modifying factors:   Context/Associated signs and symptoms: The patient was off Rinvoq in July after being diagnosed with squamous cell carcinoma of the tongue. He then restarted Rinvoq in August 2024. He was not having any symptoms at the time and restarted medication when he could swallow again. He has been doing well from a dermatomyositis standpoint. He states his wife has noticed some rash on his back and face. He thinks the rash on his face may be a side effect of pilocarpine, but has been off this for a month and still has some rash.     RHEUMATOLOGY REVIEW OF SYSTEMS   Positives as per history  Negatives as follows:  CONSTITUTlONAL:  Denies unexplained persistent fevers, weight change, chronic fatigue  HEAD/EYES:   Denies eye redness, blurry vision or sudden loss of vision, dry eyes, HA, temporal artery pain  ENT:    Denies oral/nasal ulcers, recurrent sinus infections, dry mouth  RESPIRATORY:  No pleuritic pain, history of pleural effusions, hemoptysis, exertional dyspnea  CARDIOVASCULAR: Denies chest pain, history of pericardial effusions  GASTRO:   Denies heartburn, esophageal dysmotility, abdominal pain, nausea, vomiting,

## 2024-12-03 NOTE — PATIENT INSTRUCTIONS
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   Although [facility] is in the process of hiring rheumatologists we do not have anything in place as of now.      We recommend the following offices for our adult rheumatology patients. It can take several months to secure an appointment so we suggest calling other offices now.     [facility]  [facility] - [phone] - Dr. Herlinda Preston, Dr. Oropeza, Dr. Ortega  [facility] - [phone] - Dr. Reji Hinojosa  [facility] - [phone] - Dr. Carson Vazquez, Dr. Schneider, Dr. Newman  [facility]) - [phone] - Dr. Per Houston  [facility] - [phone] - Dr. Long    [facility]  [facility] - [phone] - Dr. Lety Hernandez, Dr. Rubinstein, Dr. Borrero, Dr. Tanja James, Dr. Renato Escamilla (Med/Peds)    We recommend the following offices for our pediatric rheumatology patients.  Pediatric Rheumatology at [facility] [phone] - Dr. Lyudmila Wise or Dr. Renato Escamilla (Med/Peds)  Pediatric Rheumatology at [facility] [phone] - Dr. Makenzie Brantley  or Dr. Ashia Pereira or Dr. Suzanne Crocker   Pediatric Rheumatology at [facility] [phone] - Dr. Iesha Stearns or Dr. Ladonna Garcia

## 2024-12-03 NOTE — PROGRESS NOTES
Chief Complaint   Patient presents with    Joint Pain     1. Have you been to the ER, urgent care clinic since your last visit?  Hospitalized since your last visit?No    2. Have you seen or consulted any other health care providers outside of the [facility] since your last visit?  Include any pap smears or colon screening. No